# Patient Record
Sex: FEMALE | Race: WHITE
[De-identification: names, ages, dates, MRNs, and addresses within clinical notes are randomized per-mention and may not be internally consistent; named-entity substitution may affect disease eponyms.]

---

## 2017-01-29 ENCOUNTER — HOSPITAL ENCOUNTER (EMERGENCY)
Dept: HOSPITAL 10 - FTE | Age: 27
Discharge: HOME | End: 2017-01-29
Payer: COMMERCIAL

## 2017-01-29 VITALS
HEIGHT: 63 IN | WEIGHT: 171.96 LBS | WEIGHT: 171.96 LBS | HEIGHT: 63 IN | BODY MASS INDEX: 30.47 KG/M2 | BODY MASS INDEX: 30.47 KG/M2

## 2017-01-29 DIAGNOSIS — G44.099: Primary | ICD-10-CM

## 2017-01-29 PROCEDURE — 81003 URINALYSIS AUTO W/O SCOPE: CPT

## 2017-01-29 PROCEDURE — 96372 THER/PROPH/DIAG INJ SC/IM: CPT

## 2017-01-29 NOTE — ERD
ER Documentation


Chief Complaint


Date/Time


DATE: 1/29/17 


TIME: 20:22


Chief Complaint


migraine since monday history





HPI


This 26-year-old female presents with headache for last 4 days.  She states 

that she has regular headaches approximately once a week.  She has the same 

symptoms that she has had a previous headaches.  She has been seen here several 

times similar issues.  She has taken butalbital in the past with good relief.  

Her primary doctor treated her with Imitrex but she finds no relief with this 

medication.  She has nausea but no vomiting and some mild photophobia but no 

visual changes, weakness, bowel or bladder incontinence.  She describes the 

headache as being in the front of her head and bitemporal area.





ROS


All systems reviewed and are negative except as per history of present illness.





Medications


Home Meds


Active Scripts


Acetamin/Butalbital/Caffeine* (Fioricet*) [id number] Tab, 1 TAB PO Q6H Y 

for PAIN, #30 TAB


   Prov:JOEL BEEBE MD         1/29/17


Butalbital-Acetaminophen-Caffeine* (Fioricet*) -40 Mg Capsule, 1 CAP PO 

Q4H Y for HEADACHE for 20 Days, #20 CAP


   Prov:ARTURO FUENTES PA-C         11/16/16


Ondansetron Hcl* (Zofran*) 4 Mg Tablet, 4 MG PO Q6H for NAUSEA AND/OR VOMITING, 

#30 TAB


   Prov:ARTURO FUENTES PA-C         11/16/16


Ibuprofen* (Ibuprofen*) 600 Mg Tablet, 600 MG PO Q6, #20 TAB 0 Refills


   Prov:FERNANDO BENSON MD         6/26/16


Reported Medications


Ferrous Gluconate (Iron) 1 Tab Tablet, 1 TAB PO DAILY, TAB


   4/11/16


Calcium Carbonate (CALCIUM) 600 Mg Tablet, 600 MG PO DAILY, TAB


   4/11/16


Prenatal Vit W-Ca,Fe,FA(<1 mg) (Prenatal Vitamins) 1 Each Tablet, 1 EACH PO, TAB


   2/23/16





Allergies


Allergies:  


Coded Allergies:  


     No Known Drug Allergies (Verified  Allergy, Unknown, 6/25/16)





PMhx/Soc


History of Surgery:  No


Anesthesia Reaction:  No


Hx Neurological Disorder:  Yes (migraine)


Hx Respiratory Disorders:  No


Hx Cardiac Disorders:  No


Hx Psychiatric Problems:  No


Hx Miscellaneous Medical Probl:  Yes (migraine)


Hx Alcohol Use:  No


Hx Substance Use:  No


Hx Tobacco Use:  No


Smoking Status:  Never smoker





Physical Exam


Vitals





Vital Signs








  Date Time  Temp Pulse Resp B/P Pulse Ox O2 Delivery O2 Flow Rate FiO2


 


1/29/17 18:41 97.8 80 18 138/81 98   








Physical Exam


Const:  [] Alert, non-ill-appearing.


Head:   Atraumatic 


Eyes:    Normal Conjunctiva.  Eyes are PERRLA extraocular movements intact.


ENT:    Normal External Ears, Nose and Mouth.


Neck:               Full range of motion..~ No meningismus.


Resp:    Clear to auscultation bilaterally


Cardio:    Regular rate and rhythm, no murmurs


Abd:    Soft, non tender, non distended. Normal bowel sounds


Skin:    No petechiae or rashes


Back:    No midline or flank tenderness


Ext:    No cyanosis, or edema


Neur:    Awake and alert.  Normal gait.  No appreciable focal neurologic 

deficits.


Psych:    Normal Mood and Affect


Results 24 hrs





 Laboratory Tests








Test


  1/29/17


20:18


 


Bedside Urine Blood Negative 


 


Bedside Urine Glucose (UA) Negative 


 


Bedside Urine Ketones (LAB) Negative 


 


Bedside Urine Leukocyte


Esterase (L Negative 


 


 


Bedside Urine Nitrite (LAB) Negative 


 


Bedside Urine Protein (LAB) 1+ 


 


Bedside Urine pH (LAB) 7.0 








 Current Medications








 Medications


  (Trade)  Dose


 Ordered  Sig/Arpita


 Route


 PRN Reason  Start Time


 Stop Time Status Last Admin


Dose Admin


 


 Ketorolac


 Tromethamine


  (Toradol)  60 mg  ONCE  STAT


 IM


   1/29/17 19:24


 1/29/17 19:26 DC 1/29/17 20:02


 











Procedures/MDM


Urine is negative for leukocytes, nitrites and glucose and hCG is negative.  

Patient was given Toradol 60 mg IM.  Patient presents with a headache of 

uncertain etiology in the frontal bitemporal area.  There is no signs or 

symptoms to suggest intracranial bleeding, mass-effect, neurologic deficit.  

There is no evidence of meningitis.  Patient states that her headache is 

similar to previous since she is taking butalbital in the past and finds relief 

she will be given a prescription of Fioricet instructed to follow-up her 

primary doctor or return to the ER for any worsening symptoms.





Departure


Diagnosis:  


 Primary Impression:  


 Headache


 Headache type:  other trigeminal autonomic cephalgia (TAC)  Intractability:  

not intractable  Qualified Code:  G44.099 - Other trigeminal autonomic 

cephalgia (TAC), not intractable


Condition:  Stable


Patient Instructions:  Headache, Unspecified





Additional Instructions:  


Recheck for new or worsening symptoms or primary care doctor.











JOEL BEEBE MD Jan 29, 2017 20:24

## 2017-02-01 ENCOUNTER — HOSPITAL ENCOUNTER (EMERGENCY)
Dept: HOSPITAL 10 - FTE | Age: 27
Discharge: HOME | End: 2017-02-01
Payer: COMMERCIAL

## 2017-02-01 VITALS
WEIGHT: 196.21 LBS | WEIGHT: 196.21 LBS | HEIGHT: 62 IN | BODY MASS INDEX: 36.11 KG/M2 | BODY MASS INDEX: 36.11 KG/M2 | HEIGHT: 62 IN

## 2017-02-01 DIAGNOSIS — R51: Primary | ICD-10-CM

## 2017-02-01 PROCEDURE — 99284 EMERGENCY DEPT VISIT MOD MDM: CPT

## 2017-02-01 NOTE — ERD
ER Documentation


Chief Complaint


Date/Time


DATE: 2/1/17 


TIME: 18:43


Chief Complaint


ha x 2 weeks





HPI


36-year-old female presents here in emergency department for chronic headache, 

worse the last 2 weeks. Describes it as throbbing pain, question scale, mildly 

better after taking Fioricet at home. Patient was in her primary care doctor, 

patient is referred to neurology specialist waiting for appointment. Patient 

denies any head injury. Patient denies any fever or vision numbness or 

tingling. Patient denies any fever or chills. Patient denies any neck pain. 

Patient had a CT scan of the brain done before, patient has had this headache 

chronically. Patient denies any nausea or vomiting.





ROS


All systems reviewed and are negative except as per history of present illness.





Medications


Home Meds


Active Scripts


Ondansetron (Ondansetron Odt) 4 Mg Tab.rapdis, 4 MG PO Q8 Y for NAUSEA AND/OR 

VOMITING, #30 TAB


   Prov:BRIANNA GARZA NP         2/1/17


Acetaminophen-Butalbital-Caffeine-Codeine* (Fioricet w/ Codeine*) [id number]

30MG Capsule, 1 CAP PO Q6H Y for PAIN LEVEL 1-5, #20 CAP


   Prov:BRIANNA GARZA NP         2/1/17


Acetamin/Butalbital/Caffeine* (Fioricet*) [id number] Tab, 1 TAB PO Q6H Y 

for PAIN, #30 TAB


   Prov:JOEL BEEBE MD         1/29/17


Butalbital-Acetaminophen-Caffeine* (Fioricet*) -40 Mg Capsule, 1 CAP PO 

Q4H Y for HEADACHE for 20 Days, #20 CAP


   Prov:ARTURO FUENTES PA-C         11/16/16


Ondansetron Hcl* (Zofran*) 4 Mg Tablet, 4 MG PO Q6H for NAUSEA AND/OR VOMITING, 

#30 TAB


   Prov:ARTURO FUENTES PA-C         11/16/16


Ibuprofen* (Ibuprofen*) 600 Mg Tablet, 600 MG PO Q6, #20 TAB 0 Refills


   Prov:FERNANDO BENSON MD         6/26/16


Reported Medications


Ferrous Gluconate (Iron) 1 Tab Tablet, 1 TAB PO DAILY, TAB


   4/11/16


Calcium Carbonate (CALCIUM) 600 Mg Tablet, 600 MG PO DAILY, TAB


   4/11/16


Prenatal Vit W-Ca,Fe,FA(<1 mg) (Prenatal Vitamins) 1 Each Tablet, 1 EACH PO, TAB


   2/23/16





Allergies


Allergies:  


Coded Allergies:  


     No Known Drug Allergies (Verified  Allergy, Unknown, 6/25/16)





PMhx/Soc


History of Surgery:  No


Anesthesia Reaction:  No


Hx Neurological Disorder:  Yes (migraine)


Hx Respiratory Disorders:  No


Hx Cardiac Disorders:  No


Hx Psychiatric Problems:  No


Hx Miscellaneous Medical Probl:  Yes (migraine)


Hx Alcohol Use:  No


Hx Substance Use:  No


Hx Tobacco Use:  No





FmHx


Family History:  No coronary disease, No diabetes, No other





Physical Exam


Vitals





Vital Signs








  Date Time  Temp Pulse Resp B/P Pulse Ox O2 Delivery O2 Flow Rate FiO2


 


2/1/17 17:52 98.2 67 18 119/67 99   








Physical Exam


GENERAL:  The patient is well developed and appropriate for usual state of 

health, in no apparent distress.


CHEST:  Clear to auscultation bilaterally. There are no rales, wheezes or 

rhonchi. 


HEART:  Regular rate and rhythm. No murmurs, clicks, rubs or gallops. No S3 or 

S4.


ABDOMEN:  Soft, nontender and nondistended. Good bowel sounds. No rebound or 

guarding. No gross peritonitis. No gross organomegaly or masses. No Jimenez sign 

or McBurney point tenderness.


BACK:  No midline or flank tenderness.


EXTREMITIES:  Equal pulses bilaterally. There is no peripheral clubbing, 

cyanosis or edema. No focal swelling or erythema. Full range of motion. Grossly 

neurovascularly intact.


NEURO:  Alert and oriented. Cranial nerves 2-12 intact. Motor strength in all 4 

extremities with 5/5 strength.  Sensation grossly intact. Normal speech and 

gait. Negative Romberg sign. Negative pronator drift.


SKIN:  There is no apparent rash or petechia. The skin is warm and dry.


HEMATOLOGIC AND LYMPHATIC:  There is no evidence of excessive bruising or 

lymphedema. No gross cervical, axillary, or inguinal lymphadenopathy.


Results 24 hrs


PROCEDURE:   CT Brain without. 


 


CLINICAL INDICATION:  Headache.


 


TECHNIQUE:   A CT of the brain was performed on multidetector high-resolution 

CT scanner utilizing axial sections from the skull base through the vertex 

without contrast.  The scan was reviewed in soft tissue brain and high 

frequency resolution bone algorithm windows.  Images were reviewed on a high-

resolution PACS workstation. The exam CTDI = 45.96 mGy and the DLP = 676.55 mGy-

cm. 


 


COMPARISON:   None available  


 


FINDINGS:


The ventricles and sulci are age-appropriate. There is no intracranial 

hemorrhage, mass effect or midline shift.  No abnormal intra-axial or extra-

axial fluid collections are seen.  The gray/white matter differentiation is 

well preserved. The visualized paranasal sinuses and mastoid air cells are 

essentially clear.


 


IMPRESSION:


1.  No acute intracranial hemorrhage, transcortical infarction or mass effect.


 


RPTAT: JJ


_____________________________________________ 


.Rodríguez Gallardo,            Date    Time 


Electronically viewed and signed by .Rodríguez Gallardo,  on 08/25/2015 21:15 


 


D:  08/25/2015 21:15  T:  08/25/2015 21:15


.N/





CC: BRIANNA GARZA NP; NO PRIMARY,CARE PHYSICIAN





Procedures/MDM


Medical Decision Making: Patient's headache most likely consistent with tension 

headache or migraine headache. There is low suspicion for neurological 

emergencies at this time since patients neurologic exam is normal. Patient did 

not have any altered level consciousness, vomiting, changes in balance or 

memory and did not have any head injury. Patients CT scan of the head done 

before does not show any neurological emergencies at this time. Repeat CT scan 

that indicated at this time. Patient was advised to see neurology specialist as 

referred by primary care doctor, was given prescription for Fioricet with 

codeine, Zofran, was return here in emergency department for any worsening 

symptoms.





Departure


Diagnosis:  


 Primary Impression:  


 Headache


 Headache type:  unspecified  Headache chronicity pattern:  acute headache  

Intractability:  not intractable  Qualified Code:  R51 - Acute nonintractable 

headache, unspecified headache type


Condition:  Stable


Patient Instructions:  Self-Care for Headaches


Referrals:  


ANTONIO DEL TORO (PCP)





Additional Instructions:  


see neurology specialist as referred by PMD











BRIANNA GARZA NP Feb 1, 2017 18:45

## 2017-05-09 ENCOUNTER — HOSPITAL ENCOUNTER (EMERGENCY)
Dept: HOSPITAL 10 - FTE | Age: 27
Discharge: HOME | End: 2017-05-09
Payer: COMMERCIAL

## 2017-05-09 VITALS
BODY MASS INDEX: 31.25 KG/M2 | WEIGHT: 176.37 LBS | HEIGHT: 63 IN | WEIGHT: 176.37 LBS | BODY MASS INDEX: 31.25 KG/M2 | HEIGHT: 63 IN

## 2017-05-09 VITALS — DIASTOLIC BLOOD PRESSURE: 62 MMHG | SYSTOLIC BLOOD PRESSURE: 115 MMHG | RESPIRATION RATE: 18 BRPM | HEART RATE: 68 BPM

## 2017-05-09 DIAGNOSIS — R07.81: Primary | ICD-10-CM

## 2017-05-09 LAB — URINE BLOOD (DIP) POC: (no result)

## 2017-05-09 PROCEDURE — 81003 URINALYSIS AUTO W/O SCOPE: CPT

## 2017-05-09 PROCEDURE — 71020: CPT

## 2017-05-09 NOTE — RADRPT
PROCEDURE:   XR Chest. 

 

CLINICAL INDICATION:   Left rib pain 

 

TECHNIQUE:   PA and Lateral views of the chest were obtained. 

 

COMPARISON:   10/10/2014 chest x-ray 

 

FINDINGS:

 

The cardiomediastinal silhouette is within normal limits. The lungs are free of infiltrates masses a
nd effusions. No evidence for pneumothorax is present.  The imaged osseous structures are normal.

 

IMPRESSION:

 

1.  No radiographic evidence for acute cardiopulmonary disease.

 

 

 

RPTAT: HDC

_____________________________________________ 

.Milly Clement MD, MD           Date    Time 

Electronically viewed and signed by .Milly Clement MD, MD on 05/09/2017 20:52 

 

D:  05/09/2017 20:52  T:  05/09/2017 20:52

.C/

## 2017-05-09 NOTE — ERD
ER Documentation


Chief Complaint


Date/Time


DATE: 17 


TIME: 20:05


Chief Complaint


sudden left rib pain waking up today





HPI


Patient is a 26-year-old female presents to the emergency department with left 

rib pain which started upon waking up this morning.  Patient describes the pain 

to be constant and dull in nature.  Patient states her current pain level is a 

7 out of 10.  Patient states the pain is localized to her left rib right under 

her breast.  Patient states "I know it is not gas".  She denies any chest pain, 

shortness of breath, diaphoresis or left upper extremity pain.  Patient denies 

any nausea, vomiting, fever, chills, abdominal pain, diarrhea.  Patient states 

she is currently on her menstrual period.  Patient denies any dysuria, frequency

, urgency or flank pain.  She denies any trauma or recent falls.





ROS


All systems reviewed and are negative except as per history of present illness.





Medications


Home Meds


Active Scripts


Nitrofurantoin Monohyd Macrocr* (Macrobid*) 100 Mg Capsr, 100 MG PO BID for 5 

Days, CAP


   Prov:DAVID BISHOP PA-C         17


Ibuprofen* (Ibuprofen*) 600 Mg Tablet, 600 MG PO Q6, #30 TAB


   Prov:DAVID BISHOP PA-C         17


Ondansetron (Ondansetron Odt) 4 Mg Tab.rapdis, 4 MG PO Q8 Y for NAUSEA AND/OR 

VOMITING, #30 TAB


   Prov:BRIANNA GARZA NP         17


Acetaminophen-Butalbital-Caffeine-Codeine* (Fioricet w/ Codeine*) [id number]

30MG Capsule, 1 CAP PO Q6H Y for PAIN LEVEL 1-5, #20 CAP


   Prov:BRIANNA GARZA NP         17


Acetamin/Butalbital/Caffeine* (Fioricet*) [id number] Tab, 1 TAB PO Q6H Y 

for PAIN, #30 TAB


   Prov:JOEL BEEBE MD         17


Butalbital-Acetaminophen-Caffeine* (Fioricet*) -40 Mg Capsule, 1 CAP PO 

Q4H Y for HEADACHE for 20 Days, #20 CAP


   Prov:ARTURO FUENTES PA-C         16


Ondansetron Hcl* (Zofran*) 4 Mg Tablet, 4 MG PO Q6H for NAUSEA AND/OR VOMITING, 

#30 TAB


   Prov:RONALDARTURO VAZQUEZ PA-C         16


Ibuprofen* (Ibuprofen*) 600 Mg Tablet, 600 MG PO Q6, #20 TAB 0 Refills


   Prov:FERNANDO BENSON MD         16


Reported Medications


Ferrous Gluconate (Iron) 1 Tab Tablet, 1 TAB PO DAILY, TAB


   16


Calcium Carbonate (CALCIUM) 600 Mg Tablet, 600 MG PO DAILY, TAB


   16


Prenatal Vit W-Ca,Fe,FA(<1 mg) (Prenatal Vitamins) 1 Each Tablet, 1 EACH PO, TAB


   16





Allergies


Allergies:  


Coded Allergies:  


     No Known Drug Allergies (Verified  Allergy, Unknown, 17)





PMhx/Soc


Medical and Surgical Hx:  pt denies Surgical Hx


History of Surgery:  No


Anesthesia Reaction:  No


Hx Neurological Disorder:  Yes (migraine)


Hx Respiratory Disorders:  No


Hx Cardiac Disorders:  No


Hx Psychiatric Problems:  No


Hx Miscellaneous Medical Probl:  Yes (migraine)


Hx Alcohol Use:  Yes


Hx Substance Use:  No


Hx Tobacco Use:  No


Smoking Status:  Never smoker





Physical Exam


Vitals





Vital Signs








  Date Time  Temp Pulse Resp B/P Pulse Ox O2 Delivery O2 Flow Rate FiO2


 


17 19:26 97.8 92 20 116/58 100   








Physical Exam


GENERAL: Well-developed, well-nourished female. Appears in no acute distress.  

Begin full sentences


HEAD: Normocephalic, atraumatic. 


EYES: Pupils are equally reactive bilaterally. EOMs grossly intact. No 

conjunctival erythema. 


ENT: Moist mucous membranes. No uvula deviation. No kissing tonsils. 


NECK: Supple. No meningismus. Normal range of motion of the neck.


LUNG: Clear to auscultation bilaterally. No rhonchi, wheezing, rales or coarse 

breath sounds. 


CHEST WALL: Tender to palpation over the left ribs under the left breast.  No 

rashes, ecchymosis or swelling noted.


HEART: Regular rate and rhythm. No murmurs, rubs or gallops.


ABDOMEN: No scars, ecchymosis or rashes noted. Soft, nontender, and 

nondistended. Positive bowel sounds in all four quadrants. No rebound tenderness

, no guarding. (-) McBurney's point tenderness. No CVA tenderness.


BACK: No midline tenderness. 


EXTREMITIES: Equal pulses bilaterally. No peripheral clubbing, cyanosis or 

edema. No unilateral leg swelling.


NEUROLOGIC: Alert and oriented. Moving all four extremities without any 

difficulty. Normal speech. Steady gait. 


SKIN: Normal color. Warm and dry. No rashes or lesions.


Results 24 hrs





 Laboratory Tests








Test


  17


19:58


 


Bedside Urine pH (LAB) 6.0 


 


Bedside Urine Protein (LAB) Negative 


 


Bedside Urine Glucose (UA) Negative 


 


Bedside Urine Ketones (LAB) Negative 


 


Bedside Urine Blood 2+ 


 


Bedside Urine Nitrite (LAB) Positive 


 


Bedside Urine Leukocyte


Esterase (L Negative 


 











Procedures/MDM


ED COURSE:


The patient was stable throughout ED course. I kept the patient and/or family 

informed of laboratory and diagnostic imaging results throughout the ED course.

  





 


DIAGNOSTIC IMAGING:


Read by radiologist.





 DIAGNOSTIC IMAGING REPORT





 Patient: ELIDA OSCAR   : 1990   Age: 26  Sex: F                    

    


 MR #:    [id number]   Acct #:   [id number]    DOS: 17 1946


 Ordering MD: DAVID BISHOP PA-C   Location:  [facility]   Room/Bed:                 

                           


 








PROCEDURE:   XR Chest. 


 


CLINICAL INDICATION:   Left rib pain 


 


TECHNIQUE:   PA and Lateral views of the chest were obtained. 


 


COMPARISON:   10/10/2014 chest x-ray 


 


FINDINGS:


 


The cardiomediastinal silhouette is within normal limits. The lungs are free of 

infiltrates masses and effusions. No evidence for pneumothorax is present.  The 

imaged osseous structures are normal.


 


IMPRESSION:


 


1.  No radiographic evidence for acute cardiopulmonary disease.


 


 


 


RPTAT: HDC


_____________________________________________ 


.Milly Clement MD, MD           Date    Time 


Electronically viewed and signed by .Milly Clement MD, MD on 2017 20:

52 


 


D:  2017 20:52  T:  2017 20:52


.C/





CC: DAVID BISHOP PA-C











MEDICAL DECISION MAKING:


Patient is a 26-year-old female presents with sided rib pain.  Vital signs were 

reviewed. Patient is afebrile. Patient was not hypoxic. Patient was 

hemodynamically stable.  Chest x-ray was obtained.  Chest x-ray was 

unremarkable.  Urine dip showed positive nitrites.  Blood was noted in the 

patient's recurrent which is most likely consistent with her current menses.  

At this time, patient's presentation is most consistent with rib contusion and 

UTI.  Low suspicion for rib fracture, pneumothorax, pleural effusion, pneumonia

, ACS, gastritis, splenic injury, nephrolithiasis, pyelonephritis, shingles.





PRESCRIPTION:


Ibuprofen, Macrobid





DISCHARGE:


At this time, patient is stable for discharge and outpatient management. I have 

instructed the patient to follow-up with his/her primary care physician in 1-2 

days. I have discussed with the patient the possibility of needing to see a 

specialist for further workup and imaging studies if symptoms persist. I have 

instructed the patient to promptly return to the ER for any new or worsening 

symptoms including increased pain, fever, nausea, vomiting, weakness or LOC. 

The patient and/or family expressed understanding of and agreement with this 

plan. All questions were answered. Home care instructions were provided.





Departure


Diagnosis:  


 Primary Impression:  


 Rib pain on left side


Condition:  Stable


Patient Instructions:  Rib Contusion


Referrals:  


[facility]





Additional Instructions:  


Call your primary care doctor TOMORROW for an appointment during the next 1-2 

days.See the doctor sooner or return here if your condition worsens before your 

appointment time.











DAVID BISHOP PA-C May 9, 2017 20:10

## 2017-07-24 ENCOUNTER — HOSPITAL ENCOUNTER (EMERGENCY)
Dept: HOSPITAL 10 - FTE | Age: 27
Discharge: HOME | End: 2017-07-24
Payer: COMMERCIAL

## 2017-07-24 VITALS
HEIGHT: 63 IN | BODY MASS INDEX: 32.03 KG/M2 | BODY MASS INDEX: 32.03 KG/M2 | HEIGHT: 63 IN | WEIGHT: 180.78 LBS | WEIGHT: 180.78 LBS

## 2017-07-24 DIAGNOSIS — N39.0: ICD-10-CM

## 2017-07-24 DIAGNOSIS — M54.5: Primary | ICD-10-CM

## 2017-07-24 LAB
URINE BLOOD (DIP) POC: (no result)
URINE BLOOD (DIP) POC: (no result)

## 2017-07-24 PROCEDURE — 96372 THER/PROPH/DIAG INJ SC/IM: CPT

## 2017-07-24 PROCEDURE — 81003 URINALYSIS AUTO W/O SCOPE: CPT

## 2017-07-24 PROCEDURE — 87086 URINE CULTURE/COLONY COUNT: CPT

## 2017-07-24 NOTE — ERD
ER Documentation


Chief Complaint


Date/Time


DATE: 7/24/17 


TIME: 10:14


Chief Complaint


LOWER BACK PAIN SINCE YESTERDAY





HPI


6-year-old female presents the emergency department today for back pain that 

started yesterday.  States she has pain when she sits, stands, bends forward 

and backward.  States the pain is going a little bit into her legs.  Denies any 

trauma.  Denies any dysuria, fevers or chills.  Denies any loss of bowel or 

bladder control.





ROS


All systems reviewed and are negative except as per history of present illness.





Medications


Home Meds


Active Scripts


Cyclobenzaprine Hcl* (Cyclobenzaprine Hcl*) 10 Mg Tablet, 10 MG PO QHS, #7 TAB


   Prov:CHRISTOPHER CORREIA PA-C         7/24/17


Naproxen* (Naprosyn*) 500 Mg Tablet, 500 MG PO BID Y for PAIN AND/OR 

INFLAMMATION, #30 TAB


   Prov:CHRISTOPHER CORREIA PA-C         7/24/17


Hydrocodone/Acetaminophen (Norco 5-325 Tablet) 1 Each Tablet, 1 TAB PO Q6H Y 

for PAIN, #12 TAB


   Prov:CHRISTOPHER CORREIA PA-C         7/24/17


Cephalexin* (Keflex*) 500 Mg Capsule, 500 MG PO QID for 7 Days, CAP


   Prov:CHRISTOPHER CORREIA PA-C         7/24/17


Nitrofurantoin Monohyd Macrocr* (Macrobid*) 100 Mg Capsr, 100 MG PO BID for 5 

Days, CAP


   Prov:DAVID BISHOP PA-C         5/9/17


Ibuprofen* (Ibuprofen*) 600 Mg Tablet, 600 MG PO Q6, #30 TAB


   Prov:DAVID BISHOPC         5/9/17


Ondansetron (Ondansetron Odt) 4 Mg Tab.rapdis, 4 MG PO Q8 Y for NAUSEA AND/OR 

VOMITING, #30 TAB


   Prov:BRIANNA GARZA NP         2/1/17


Acetaminophen-Butalbital-Caffeine-Codeine* (Fioricet w/ Codeine*) [id number]

30MG Capsule, 1 CAP PO Q6H Y for PAIN LEVEL 1-5, #20 CAP


   Prov:BRIANNA GARZA NP         2/1/17


Acetamin/Butalbital/Caffeine* (Fioricet*) [id number] Tab, 1 TAB PO Q6H Y 

for PAIN, #30 TAB


   Prov:JOEL BEEBE MD         1/29/17


Butalbital-Acetaminophen-Caffeine* (Fioricet*) -40 Mg Capsule, 1 CAP PO 

Q4H Y for HEADACHE for 20 Days, #20 CAP


   Prov:ARTURO FUENTES PA-C         11/16/16


Ondansetron Hcl* (Zofran*) 4 Mg Tablet, 4 MG PO Q6H for NAUSEA AND/OR VOMITING, 

#30 TAB


   Prov:ARTURO FUENTES PA-C         11/16/16


Ibuprofen* (Ibuprofen*) 600 Mg Tablet, 600 MG PO Q6, #20 TAB 0 Refills


   Prov:FERNANDO BENSON MD         6/26/16


Reported Medications


Ferrous Gluconate (Iron) 1 Tab Tablet, 1 TAB PO DAILY, TAB


   4/11/16


Calcium Carbonate (CALCIUM) 600 Mg Tablet, 600 MG PO DAILY, TAB


   4/11/16


Prenatal Vit W-Ca,Fe,FA(<1 mg) (Prenatal Vitamins) 1 Each Tablet, 1 EACH PO, TAB


   2/23/16





Allergies


Allergies:  


Coded Allergies:  


     No Known Drug Allergies (Verified  Allergy, Unknown, 5/9/17)





PMhx/Soc


History of Surgery:  No


Anesthesia Reaction:  No


Hx Neurological Disorder:  Yes (migraine)


Hx Respiratory Disorders:  No


Hx Cardiac Disorders:  No


Hx Psychiatric Problems:  No


Hx Miscellaneous Medical Probl:  No


Hx Alcohol Use:  Yes (SOCIALLY)


Hx Substance Use:  No


Hx Tobacco Use:  No


Smoking Status:  Never smoker





Physical Exam


Vitals





Vital Signs








  Date Time  Temp Pulse Resp B/P Pulse Ox O2 Delivery O2 Flow Rate FiO2


 


7/24/17 09:56 98.3 95 18 116/63 100   








Physical Exam


Const:     No acute distress


Head:   Atraumatic 


Eyes:    Normal Conjunctiva


ENT:    Normal External Ears, Nose and Mouth.


Neck:               Full range of motion..~ No meningismus.


Resp:    Clear to auscultation bilaterally


Cardio:    Regular rate and rhythm, no murmurs


Skin:    No petechiae or rashes


Back:   Lumbar and sacral midline and bilateral paraspinal tenderness.  

Decreased range of motion secondary to pain.  Positive straight leg raise.  

Pulses 2+.  Distal neurovascularly intact.


Ext:    No cyanosis, or edema


Neur:    Awake and alert


Psych:    Normal Mood and Affect


Results 24 hrs





 Laboratory Tests








Test


  7/24/17


10:34


 


Bedside Urine pH (LAB) 7.0 


 


Bedside Urine Protein (LAB) 1+ 


 


Bedside Urine Glucose (UA) Negative 


 


Bedside Urine Ketones (LAB) Negative 


 


Bedside Urine Blood 1+ 


 


Bedside Urine Nitrite (LAB) Negative 


 


Bedside Urine Leukocyte


Esterase (L 2+ 


 








 Current Medications








 Medications


  (Trade)  Dose


 Ordered  Sig/Arpita


 Route


 PRN Reason  Start Time


 Stop Time Status Last Admin


Dose Admin


 


 Ketorolac


 Tromethamine


  (Toradol)  30 mg  ONCE  STAT


 IM


   7/24/17 10:13


 7/24/17 10:14 DC 7/24/17 10:37


 











Procedures/MDM


This is a 26-year-old female who presents to the emergency department today 

complaining of back pain that started yesterday.  Patient had no trauma no fall 

and she has had only one day of back pain I do not feel that imaging is 

necessary at this time





Low suspicion for acute fracture or  dislocation.  Patient is afebrile and 

otherwise well-appearing.  They have no loss of bowel or bladder control.  Low 

suspicion for cauda equina or abscess.





I did check a UA that shows 2+ leukocyte esterase and 1+ hematuria


Pregnancy test is negative





Patient's back pain may be related to urinary tract infection however she does 

have some musculoskeletal pain.  Patient was seen here in the emergency 

department 2 months ago for UTI was treated with Macrobid.  Patient will be 

given a prescription for Keflex today.  I did send the urine for culture given 

patient's 2 urinary tract infections in the past 2 months





Patient was given Toradol here in the emergency department.  Patient still 

complained of pain however she took ibuprofen prior to arrival and she is 

driving home..  Patient will be given a prescription for Keflex, short course 

of Norco, Naprosyn, Flexeril for home





At this time the patient is stable for discharge and outpatient management. 

Patient should follow up with their PCP in the next 1-2 days.  They may return 

to the emergency department sooner for any persistent or worsening of symptoms.

  Patient understood and agreed with the plan.





Departure


Diagnosis:  


 Primary Impression:  


 Back pain


 Back pain location:  low back pain  Chronicity:  acute  Back pain laterality:  

bilateral  Sciatica presence:  unspecified whether sciatica present  Qualified 

Code:  M54.5 - Acute bilateral low back pain, with sciatica presence unspecified


 Additional Impression:  


 UTI (urinary tract infection)


 Urinary tract infection type:  site unspecified  Hematuria presence:  with 

hematuria  Qualified Code:  N39.0 - Urinary tract infection with hematuria, 

site unspecified


Condition:  CHRISTOPHER Draper PA-C Jul 24, 2017 10:16

## 2017-07-29 LAB — URINE BLOOD (DIP) POC: (no result)

## 2019-03-26 ENCOUNTER — HOSPITAL ENCOUNTER (EMERGENCY)
Dept: HOSPITAL 10 - FTE | Age: 29
Discharge: HOME | End: 2019-03-26
Payer: COMMERCIAL

## 2019-03-26 ENCOUNTER — HOSPITAL ENCOUNTER (EMERGENCY)
Dept: HOSPITAL 91 - FTE | Age: 29
Discharge: HOME | End: 2019-03-26
Payer: COMMERCIAL

## 2019-03-26 VITALS — RESPIRATION RATE: 18 BRPM | DIASTOLIC BLOOD PRESSURE: 65 MMHG | HEART RATE: 87 BPM | SYSTOLIC BLOOD PRESSURE: 143 MMHG

## 2019-03-26 VITALS — BODY MASS INDEX: 45.92 KG/M2 | WEIGHT: 198.42 LBS | HEIGHT: 55 IN

## 2019-03-26 DIAGNOSIS — L98.0: Primary | ICD-10-CM

## 2019-03-26 PROCEDURE — 99282 EMERGENCY DEPT VISIT SF MDM: CPT

## 2019-03-26 RX ADMIN — IBUPROFEN 1 MG: 600 TABLET ORAL at 09:11

## 2019-03-26 NOTE — ERD
ER Documentation


Chief Complaint


Chief Complaint





has lump? wart back neck x 1 year , wants pain meds





HPI


Patient is a 28-year-old female who presents the ER for concerns of external 


neck mass times 1 year.  Patient states she is currently seeing a dermatologist 


for this mass.  She was told that it was possibly skin cancer versus pyogenic 


granuloma.  Patient states that she has a referral pending to see specialized 


dermatologist and head and neck.  Patient states that the lesion bleeds 


occasionally, no bleeding at this time.  Patient states the lesion is painful 


when she touches it.  Patient has no fevers or chills.  Patient has normal range


of motion of her neck.  Patient has no arm numbness or tingling.





ROS


All systems reviewed and are negative except as per history of present illness.





Medications


Home Meds


Active Scripts


Ibuprofen* (Motrin*) 600 Mg Tab, 600 MG PO Q6, #30 TAB


   Prov:DAVID BISHOPC         3/26/19


Acetaminophen with Codeine (Acetaminophen-Cod #3 Tablet) 1 Each Tablet, 1 TAB PO


Q6H PRN for PAIN, #7 TAB


   Prov:DAVID BISHOPC         3/26/19


Cyclobenzaprine Hcl* (Cyclobenzaprine Hcl*) 10 Mg Tablet, 10 MG PO QHS, #7 TAB


   Prov:CHRISTOPHER CORREIA-C         7/24/17


Naproxen* (Naprosyn*) 500 Mg Tablet, 500 MG PO BID PRN for PAIN AND/OR 


INFLAMMATION, #30 TAB


   Prov:CHRISTOPHER CORREIA-C         7/24/17


Hydrocodone/Acetaminophen (Norco 5-325 Tablet) 1 Each Tablet, 1 TAB PO Q6H PRN 


for PAIN, #12 TAB


   Prov:CHRISTOPHER CORREIAC         7/24/17


Cephalexin* (Keflex*) 500 Mg Capsule, 500 MG PO QID for 7 Days, CAP


   Prov:CHRISTOPHER CORREIA-C         7/24/17


Nitrofurantoin Monohyd Macrocr* (Macrobid*) 100 Mg Capsr, 100 MG PO BID for 5 


Days, CAP


   Prov:DAVID BISHOPC         5/9/17


Ibuprofen* (Ibuprofen*) 600 Mg Tablet, 600 MG PO Q6, #30 TAB


   Prov:DAVID BISHOP PA-C         5/9/17


Ondansetron (Ondansetron Odt) 4 Mg Tab.rapdis, 4 MG PO Q8 PRN for NAUSEA AND/OR 


VOMITING, #30 TAB


   Prov:BRIANNA GARZA NP         2/1/17


Acetaminophen-Butalbital-Caffeine-Codeine* (Fioricet w/ Codeine*) 


[id number] Capsule, 1 CAP PO Q6H PRN for PAIN LEVEL 1-5, #20 CAP


   Prov:BRIANNA GARZA NP         2/1/17


Acetamin/Butalbital/Caffeine* (Fioricet*) [id number] Tab, 1 TAB PO Q6H PRN 


for PAIN, #30 TAB


   Prov:JOEL BEEBE MD         1/29/17


Butalbital-Acetaminophen-Caffeine* (Fioricet*) -40 Mg Capsule, 1 CAP PO 


Q4H PRN for HEADACHE for 20 Days, #20 CAP


   Prov:ARTURO FUENTES PA-C         11/16/16


Ondansetron Hcl* (Zofran*) 4 Mg Tablet, 4 MG PO Q6H for NAUSEA AND/OR VOMITING, 


#30 TAB


   Prov:ARTURO FUENTES PA-C         11/16/16


Ibuprofen* (Ibuprofen*) 600 Mg Tablet, 600 MG PO Q6, #20 TAB 0 Refills


   Prov:FERNANDO BENSON MD         6/26/16


Reported Medications


Ferrous Gluconate (Iron) 1 Tab Tablet, 1 TAB PO DAILY, TAB


   4/11/16


Calcium Carbonate (CALCIUM) 600 Mg Tablet, 600 MG PO DAILY, TAB


   4/11/16


Prenatal Vit W-Ca,Fe,FA(<1 mg) (Prenatal Vitamins) 1 Each Tablet, 1 EACH PO, TAB


   2/23/16





Allergies


Allergies:  


Coded Allergies:  


     No Known Drug Allergies (Verified  Allergy, Unknown, 5/9/17)





PMhx/Soc


History of Surgery:  No


Anesthesia Reaction:  No


Hx Neurological Disorder:  Yes (migraine)


Hx Respiratory Disorders:  No


Hx Cardiac Disorders:  No


Hx Psychiatric Problems:  No


Hx Miscellaneous Medical Probl:  No


Hx Alcohol Use:  Yes (SOCIALLY)


Hx Substance Use:  No


Hx Tobacco Use:  No





FmHx


Family History:  No diabetes





Physical Exam


Vitals





Vital Signs


  Date      Temp  Pulse  Resp  B/P (MAP)   Pulse Ox  O2          O2 Flow    FiO2


Time                                                 Delivery    Rate


   3/26/19  98.1     87    18      143/65        99


     08:39                           (91)





Physical Exam


GENERAL: Well-developed, well-nourished female. Appears in no acute distress 


speaking in full sentences. 


HEAD: Normocephalic, atraumatic. 


EYES: Pupils are equally reactive bilaterally. EOMs grossly intact. No 


conjunctival erythema. 


ENT: Moist mucous membranes. No uvula deviation. No kissing tonsils. 


NECK: Supple. No meningismus. Normal range of motion of the neck.  No neck 


stiffness.


LUNG: Clear to auscultation bilaterally. No rhonchi, wheezing, rales or coarse 


breath sounds. 


HEART: Regular rate and rhythm. No murmurs, rubs or gallops.


EXTREMITIES: Equal pulses bilaterally. No peripheral clubbing, cyanosis or 


edema. No unilateral leg swelling.


NEUROLOGIC: Alert and oriented. Moving all four extremities without any 


difficulty. Normal speech. Steady gait.  Equal  strength bilaterally.


SKIN: Quarter sized erythematous, irregular mass noted mid neck.  Mass does 


appear consistent with a pyogenic granuloma.  No active bleeding.


Results 24 hrs





Current Medications


 Medications
   Dose
          Sig/Arpita
       Start Time
   Status  Last


 (Trade)       Ordered        Route
 PRN     Stop Time              Admin
Dose


                              Reason                                Admin


 Ibuprofen
     600 mg         ONCE  ONCE
    3/26/19                



(Motrin)                      PO
            09:30



                                             3/26/19 09:31








Procedures/MDM


MEDICAL DECISION MAKING:


Patient is a 28-year-old female presents ER for concerns of neck mass times 1 


year.. Vital signs were reviewed. Patient is afebrile. Patient was not hypoxic. 


Patient was hemodynamically stable.  Patient states that the dermatologist did 


tell her it is likely a pyogenic granuloma.  Patient is currently under the care


of a dermatologist and is pending referral to see a specialty dermatologist and 


head and neck.  Patient states she is here because of her pain.   patient denied


any fevers or chills.  Patient will be given short course of Tylenol No. 3 for h


er pain.  Patient was advised to call her dermatologist and see if her referral 


is approved.  Patient has no neck stiffness or fevers.  Low suspicion for 


meningitis or deep space infection.  Patient was nontoxic, non-ill-appearing 


prior to discharge.





PRESCRIPTION:


Ibuprofen


Tylenol No. 3.  Patient advised not to take this medication when driving or 


operating any machinery.





DISCHARGE:


At this time, patient is stable for discharge and outpatient management. I have 


instructed the patient to follow-up with his/her primary care physician in 1-2 


days. I have discussed with the patient the possibility of needing to see a 


specialist for further workup and imaging studies if symptoms persist. I have 


instructed the patient to promptly return to the ER for any new or worsening 


symptoms including increased pain, fever, nausea, vomiting, weakness or LOC. The


patient and/or family expressed understanding of and agreement with this plan. 


All questions were answered. Home care instructions were provided. 





Disclaimer: Inadvertent spelling and grammatical errors are likely due to 


EHR/dictation software use and do not reflect on the overall quality of patient 


care. Also, please note that the electronic time recorded on this note does not 


necessarily reflect the actual time of the patient encounter.





Departure


Diagnosis:  


   Primary Impression:  


   Pyogenic granuloma


Condition:  Fair


Patient Instructions:  Pyogenic Granuloma


Referrals:  


JOHNY GARNICA MD,NIKIA KROOMA,VINOD BADILLO,KADY SAWANT,LETICIA HERNANDEZ,DARRICK ROJAS





Additional Instructions:  


Follow-up with your dermatologist as scheduled to remove your mass.





Do not take Tylenol No. 3 when driving or operating any machinery.





Call your primary care doctor TOMORROW for an appointment during the next 1-2 


days.See the doctor sooner or return here if your condition worsens before your 


appointment time.











DAVID BISHOP PA-C             Mar 26, 2019 09:08

## 2019-08-15 ENCOUNTER — HOSPITAL ENCOUNTER (EMERGENCY)
Dept: HOSPITAL 10 - FTE | Age: 29
Discharge: HOME | End: 2019-08-15
Payer: COMMERCIAL

## 2019-08-15 VITALS
HEIGHT: 63 IN | BODY MASS INDEX: 33.71 KG/M2 | WEIGHT: 190.26 LBS | HEIGHT: 63 IN | WEIGHT: 190.26 LBS | BODY MASS INDEX: 33.71 KG/M2

## 2019-08-15 VITALS — SYSTOLIC BLOOD PRESSURE: 127 MMHG | HEART RATE: 84 BPM | RESPIRATION RATE: 20 BRPM | DIASTOLIC BLOOD PRESSURE: 67 MMHG

## 2019-08-15 DIAGNOSIS — M79.642: Primary | ICD-10-CM

## 2019-08-15 PROCEDURE — 29125 APPL SHORT ARM SPLINT STATIC: CPT

## 2019-08-15 PROCEDURE — 73130 X-RAY EXAM OF HAND: CPT

## 2019-08-15 PROCEDURE — 73110 X-RAY EXAM OF WRIST: CPT
